# Patient Record
Sex: MALE | Race: WHITE | Employment: FULL TIME | URBAN - METROPOLITAN AREA
[De-identification: names, ages, dates, MRNs, and addresses within clinical notes are randomized per-mention and may not be internally consistent; named-entity substitution may affect disease eponyms.]

---

## 2018-05-11 ENCOUNTER — OFFICE VISIT (OUTPATIENT)
Dept: SURGERY | Facility: CLINIC | Age: 43
End: 2018-05-11

## 2018-05-11 VITALS
DIASTOLIC BLOOD PRESSURE: 83 MMHG | TEMPERATURE: 97 F | WEIGHT: 196 LBS | BODY MASS INDEX: 28.06 KG/M2 | HEART RATE: 64 BPM | SYSTOLIC BLOOD PRESSURE: 136 MMHG | HEIGHT: 70 IN

## 2018-05-11 DIAGNOSIS — K64.8 INTERNAL HEMORRHOIDS WITH COMPLICATION: Primary | ICD-10-CM

## 2018-05-11 PROCEDURE — 46600 DIAGNOSTIC ANOSCOPY SPX: CPT | Performed by: SURGERY

## 2018-05-11 PROCEDURE — 99243 OFF/OP CNSLTJ NEW/EST LOW 30: CPT | Performed by: SURGERY

## 2018-05-15 ENCOUNTER — TELEPHONE (OUTPATIENT)
Dept: SURGERY | Facility: CLINIC | Age: 43
End: 2018-05-15

## 2018-05-15 DIAGNOSIS — K64.9 HEMORRHOIDS, UNSPECIFIED HEMORRHOID TYPE: Primary | ICD-10-CM

## 2018-05-16 ENCOUNTER — TELEPHONE (OUTPATIENT)
Dept: SURGERY | Facility: CLINIC | Age: 43
End: 2018-05-16

## 2018-05-23 ENCOUNTER — TELEPHONE (OUTPATIENT)
Dept: SURGERY | Facility: CLINIC | Age: 43
End: 2018-05-23

## 2018-05-23 ENCOUNTER — ANESTHESIA (OUTPATIENT)
Dept: SURGERY | Facility: HOSPITAL | Age: 43
End: 2018-05-23
Payer: COMMERCIAL

## 2018-05-23 ENCOUNTER — ANESTHESIA EVENT (OUTPATIENT)
Dept: SURGERY | Facility: HOSPITAL | Age: 43
End: 2018-05-23
Payer: COMMERCIAL

## 2018-05-23 DIAGNOSIS — K64.5 THROMBOSED HEMORRHOIDS: Primary | ICD-10-CM

## 2018-05-23 RX ORDER — ACETAMINOPHEN 500 MG
1000 TABLET ORAL ONCE
Status: CANCELLED | OUTPATIENT
Start: 2018-05-23 | End: 2018-05-23

## 2018-05-23 RX ORDER — SODIUM CHLORIDE, SODIUM LACTATE, POTASSIUM CHLORIDE, CALCIUM CHLORIDE 600; 310; 30; 20 MG/100ML; MG/100ML; MG/100ML; MG/100ML
INJECTION, SOLUTION INTRAVENOUS CONTINUOUS
Status: CANCELLED | OUTPATIENT
Start: 2018-05-23

## 2018-05-24 ENCOUNTER — SURGERY (OUTPATIENT)
Age: 43
End: 2018-05-24

## 2018-05-24 ENCOUNTER — HOSPITAL ENCOUNTER (OUTPATIENT)
Facility: HOSPITAL | Age: 43
Setting detail: HOSPITAL OUTPATIENT SURGERY
Discharge: HOME | End: 2018-05-24
Attending: SURGERY | Admitting: SURGERY
Payer: COMMERCIAL

## 2018-05-24 VITALS
DIASTOLIC BLOOD PRESSURE: 83 MMHG | WEIGHT: 198 LBS | OXYGEN SATURATION: 97 % | HEART RATE: 67 BPM | TEMPERATURE: 98 F | SYSTOLIC BLOOD PRESSURE: 120 MMHG | RESPIRATION RATE: 20 BRPM | HEIGHT: 70 IN | BODY MASS INDEX: 28.35 KG/M2

## 2018-05-24 DIAGNOSIS — K64.9 HEMORRHOIDS, UNSPECIFIED HEMORRHOID TYPE: ICD-10-CM

## 2018-05-24 PROCEDURE — 06LY3ZC OCCLUSION OF HEMORRHOIDAL PLEXUS, PERCUTANEOUS APPROACH: ICD-10-PCS | Performed by: SURGERY

## 2018-05-24 PROCEDURE — 46947 HEMORRHOIDOPEXY BY STAPLING: CPT | Performed by: SURGERY

## 2018-05-24 RX ORDER — ACETAMINOPHEN 500 MG
1000 TABLET ORAL ONCE
COMMUNITY

## 2018-05-24 RX ORDER — MEPERIDINE HYDROCHLORIDE 25 MG/ML
12.5 INJECTION INTRAMUSCULAR; INTRAVENOUS; SUBCUTANEOUS AS NEEDED
Status: DISCONTINUED | OUTPATIENT
Start: 2018-05-24 | End: 2018-05-24

## 2018-05-24 RX ORDER — DOCUSATE SODIUM 100 MG/1
100 CAPSULE, LIQUID FILLED ORAL 3 TIMES DAILY
Qty: 90 CAPSULE | Refills: 0 | Status: SHIPPED | OUTPATIENT
Start: 2018-05-24

## 2018-05-24 RX ORDER — SODIUM CHLORIDE, SODIUM LACTATE, POTASSIUM CHLORIDE, CALCIUM CHLORIDE 600; 310; 30; 20 MG/100ML; MG/100ML; MG/100ML; MG/100ML
INJECTION, SOLUTION INTRAVENOUS CONTINUOUS
Status: DISCONTINUED | OUTPATIENT
Start: 2018-05-24 | End: 2018-05-24

## 2018-05-24 RX ORDER — HYDROMORPHONE HYDROCHLORIDE 1 MG/ML
INJECTION, SOLUTION INTRAMUSCULAR; INTRAVENOUS; SUBCUTANEOUS
Status: COMPLETED
Start: 2018-05-24 | End: 2018-05-24

## 2018-05-24 RX ORDER — HYDROMORPHONE HYDROCHLORIDE 1 MG/ML
0.4 INJECTION, SOLUTION INTRAMUSCULAR; INTRAVENOUS; SUBCUTANEOUS EVERY 5 MIN PRN
Status: DISCONTINUED | OUTPATIENT
Start: 2018-05-24 | End: 2018-05-24

## 2018-05-24 RX ORDER — METOCLOPRAMIDE HYDROCHLORIDE 5 MG/ML
10 INJECTION INTRAMUSCULAR; INTRAVENOUS AS NEEDED
Status: DISCONTINUED | OUTPATIENT
Start: 2018-05-24 | End: 2018-05-24

## 2018-05-24 RX ORDER — DEXAMETHASONE SODIUM PHOSPHATE 4 MG/ML
4 VIAL (ML) INJECTION AS NEEDED
Status: DISCONTINUED | OUTPATIENT
Start: 2018-05-24 | End: 2018-05-24

## 2018-05-24 RX ORDER — ACETAMINOPHEN 500 MG
1000 TABLET ORAL ONCE AS NEEDED
Status: DISCONTINUED | OUTPATIENT
Start: 2018-05-24 | End: 2018-05-24

## 2018-05-24 RX ORDER — HYDROCODONE BITARTRATE AND ACETAMINOPHEN 5; 325 MG/1; MG/1
TABLET ORAL
Qty: 20 TABLET | Refills: 0 | Status: SHIPPED | OUTPATIENT
Start: 2018-05-24 | End: 2018-06-08 | Stop reason: ALTCHOICE

## 2018-05-24 RX ORDER — LABETALOL HYDROCHLORIDE 5 MG/ML
5 INJECTION, SOLUTION INTRAVENOUS EVERY 5 MIN PRN
Status: DISCONTINUED | OUTPATIENT
Start: 2018-05-24 | End: 2018-05-24

## 2018-05-24 RX ORDER — MIDAZOLAM HYDROCHLORIDE 1 MG/ML
1 INJECTION INTRAMUSCULAR; INTRAVENOUS EVERY 5 MIN PRN
Status: DISCONTINUED | OUTPATIENT
Start: 2018-05-24 | End: 2018-05-24

## 2018-05-24 RX ORDER — HEPARIN SODIUM 5000 [USP'U]/ML
INJECTION, SOLUTION INTRAVENOUS; SUBCUTANEOUS
Status: COMPLETED
Start: 2018-05-24 | End: 2018-05-24

## 2018-05-24 RX ORDER — HYDROCODONE BITARTRATE AND ACETAMINOPHEN 5; 325 MG/1; MG/1
2 TABLET ORAL AS NEEDED
Status: DISCONTINUED | OUTPATIENT
Start: 2018-05-24 | End: 2018-05-24

## 2018-05-24 RX ORDER — BUPIVACAINE HYDROCHLORIDE AND EPINEPHRINE 5; 5 MG/ML; UG/ML
INJECTION, SOLUTION EPIDURAL; INTRACAUDAL; PERINEURAL AS NEEDED
Status: DISCONTINUED | OUTPATIENT
Start: 2018-05-24 | End: 2018-05-24 | Stop reason: HOSPADM

## 2018-05-24 RX ORDER — ONDANSETRON 2 MG/ML
4 INJECTION INTRAMUSCULAR; INTRAVENOUS AS NEEDED
Status: DISCONTINUED | OUTPATIENT
Start: 2018-05-24 | End: 2018-05-24

## 2018-05-24 RX ORDER — HYDROCODONE BITARTRATE AND ACETAMINOPHEN 5; 325 MG/1; MG/1
1 TABLET ORAL AS NEEDED
Status: DISCONTINUED | OUTPATIENT
Start: 2018-05-24 | End: 2018-05-24

## 2018-05-24 RX ORDER — NALOXONE HYDROCHLORIDE 0.4 MG/ML
80 INJECTION, SOLUTION INTRAMUSCULAR; INTRAVENOUS; SUBCUTANEOUS AS NEEDED
Status: DISCONTINUED | OUTPATIENT
Start: 2018-05-24 | End: 2018-05-24

## 2018-05-24 RX ORDER — HEPARIN SODIUM 5000 [USP'U]/ML
5000 INJECTION, SOLUTION INTRAVENOUS; SUBCUTANEOUS ONCE
Status: COMPLETED | OUTPATIENT
Start: 2018-05-24 | End: 2018-05-24

## 2018-05-24 NOTE — ANESTHESIA PREPROCEDURE EVALUATION
PRE-OP EVALUATION    Patient Name: Shira Zaman    Pre-op Diagnosis: Hemorrhoids, unspecified hemorrhoid type [K64.9]    Procedure(s):  STAPLED HEMORRHOIDECTOMY    Surgeon(s) and Role:     * Adore Zuniga MD - Primary    Pre-op [facility]      Rhythm: regular  Rate: normal     Dental             Pulmonary      Breath sounds clear to auscultation bilaterally. Other findings            ASA: 2   Plan: general  NPO status verified and patient meets guidelines.     Po

## 2018-05-24 NOTE — ANESTHESIA POSTPROCEDURE EVALUATION
[id number] [facility] Patient Status:  Hospital Outpatient Surgery   Age/Gender 38year old male MRN [id number]   Location [address] [place] [address] Cass Lowe MD   Hosp Day # 0 PCP Lesli Almodovar

## 2018-05-24 NOTE — BRIEF OP NOTE
Pre-Operative Diagnosis: Hemorrhoids, unspecified hemorrhoid type [K64.9]     Post-Operative Diagnosis: Hemorrhoids, unspecified hemorrhoid type [K64.9]      Procedure Performed:   Procedure(s):  PPH STAPLED HEMORRHOIDECTOMY    Surgeon(s) and Role:     * P

## 2018-05-24 NOTE — OPERATIVE REPORT
OPERATIVE REPORT   PREOPERATIVE DIAGNOSIS: Prolapsed hemorrhoids. POSTOPERATIVE DIAGNOSIS: Prolapsed hemorrhoids. PROCEDURE PERFORMED: PPH stapled hemorrhoidectomy. ASSISTANT: Staff  ANESTHESIA: General endotracheal anesthesia.    ANESTHESIOLOGIST:  with the [facility] HSPTL stapler was inserted into the anus and secured in place with 2-0 nylon suture. A circumferential suture of #1 Prolene was used to encircle the anus at a level 2-3 cm above the dentate line, incorporating all hemorrhoidal tissues.  This pursestr

## 2018-05-24 NOTE — H&P
Active Problems      1. Internal hemorrhoids with complication          Chief Complaint   Patient presents with:  Hemorrhoids: NW/EST PTA FOR HEMORRHOIDS- has been seen prior regarding. bleeding with wiping and toilets. has had previous bandings. bad pain. Years of education:                 Number of children:                Social History Main Topics    Smoking status: Never Smoker                                                                 Smokeless tobacco: Former User                       Alcoh 136/83 (BP Location: Left arm, Patient Position: Sitting, Cuff Size: large)   Pulse 64   Temp (!) 96.9 °F (36.1 °C) (Oral)   Ht 70\"   Wt 196 lb   BMI 28.12 kg/m²   Physical Exam   Constitutional: He is oriented to person, place, and time.  He appears well- in length. ·    · The risks, benefits, and alternatives to the procedure were explained to the patient.   The risks explained include, but are not limited to, bleeding, infection, pain wound complications, recurrence, incorrect diagnosis, injury to adjace

## 2018-05-30 ENCOUNTER — TELEPHONE (OUTPATIENT)
Dept: SURGERY | Facility: CLINIC | Age: 43
End: 2018-05-30

## 2018-05-30 NOTE — TELEPHONE ENCOUNTER
Pt called office for norco refill. Had hemorroidectomy 5/24 with Dr. Dominique Bell. Refill filled for norco 5/325 1-2 tab po q 4-6 hrs for pain. Disp 30. Per Dr. Danny [place]. Script ready for pt. Pickup.

## 2018-06-08 ENCOUNTER — OFFICE VISIT (OUTPATIENT)
Dept: SURGERY | Facility: CLINIC | Age: 43
End: 2018-06-08

## 2018-06-08 VITALS
WEIGHT: 188.63 LBS | DIASTOLIC BLOOD PRESSURE: 86 MMHG | BODY MASS INDEX: 26.41 KG/M2 | SYSTOLIC BLOOD PRESSURE: 134 MMHG | HEIGHT: 71 IN | HEART RATE: 52 BPM | RESPIRATION RATE: 16 BRPM | TEMPERATURE: 97 F

## 2018-06-08 DIAGNOSIS — K64.8 INTERNAL HEMORRHOIDS WITH COMPLICATION: Primary | ICD-10-CM

## 2018-06-08 PROCEDURE — 99024 POSTOP FOLLOW-UP VISIT: CPT | Performed by: SURGERY

## 2018-06-08 NOTE — PROGRESS NOTES
Post Operative Visit Note       Active Problems  1. Internal hemorrhoids with complication         Chief Complaint   Patient presents with:  Post-Op: p/o hemorrhoidectomy on 5/24. PT has rectal PN (8/10)- ran out of Wanna Matthew- states tylenol doesn't work.  PT d Alcohol use:  Yes                Comment: social    Drug use: No            Other Topics            Concern  Caffeine Concern        Yes    Comment:coffee 1 cup daily  Exercise                Yes  Seat Belt               Yes         Current Outp patient did experience postoperative pain and discomfort but this is gradually improving. His narcotic pain medication was refilled today; we discussed about pain management strategies in order to minimize narcotic requirements.   The patient voiced unders

## 2018-06-21 ENCOUNTER — OFFICE VISIT (OUTPATIENT)
Dept: SURGERY | Facility: CLINIC | Age: 43
End: 2018-06-21

## 2018-06-21 VITALS
DIASTOLIC BLOOD PRESSURE: 75 MMHG | SYSTOLIC BLOOD PRESSURE: 117 MMHG | HEART RATE: 67 BPM | TEMPERATURE: 97 F | BODY MASS INDEX: 26.32 KG/M2 | WEIGHT: 188 LBS | HEIGHT: 71 IN

## 2018-06-21 DIAGNOSIS — K64.8 INTERNAL HEMORRHOIDS WITH COMPLICATION: Primary | ICD-10-CM

## 2018-06-21 PROCEDURE — 99024 POSTOP FOLLOW-UP VISIT: CPT | Performed by: SURGERY

## 2018-06-21 NOTE — PROGRESS NOTES
Post Operative Visit Note       Active Problems  1. Internal hemorrhoids with complication         Chief Complaint   Patient presents with:  Post-Op: Post op visit-- Hemorrhoidectomy done 5/24/18. Still some bleeding and pressure pain.          History of P Smoking status: Never Smoker                                                                Smokeless tobacco: Former User                       Alcohol use:  Yes                Comment: social    Drug use: No            Other Topics            Concern  Caf appears well-developed and well-nourished. Cardiovascular: Normal rate, regular rhythm, normal heart sounds and intact distal pulses. Pulmonary/Chest: Effort normal and breath sounds normal.   Abdominal: Soft.  Bowel sounds are normal.   Genitourinary:

## 2019-08-14 ENCOUNTER — MED REC SCAN ONLY (OUTPATIENT)
Dept: FAMILY MEDICINE CLINIC | Facility: CLINIC | Age: 44
End: 2019-08-14

## 2020-08-20 ENCOUNTER — TELEPHONE (OUTPATIENT)
Dept: FAMILY MEDICINE CLINIC | Facility: CLINIC | Age: 45
End: 2020-08-20

## 2020-08-20 DIAGNOSIS — Z11.2 ENCOUNTER FOR SCREENING FOR OTHER BACTERIAL DISEASE: Primary | ICD-10-CM

## 2020-08-20 NOTE — TELEPHONE ENCOUNTER
Pt wife called, she tested positive for H-Pilori and she is wanting pt to get tested as well. She is requesting order for breath test. Please advise, thank you.

## 2020-08-20 NOTE — TELEPHONE ENCOUNTER
Left message on Sarah's  [phone] to call office back to notify her that requested orders were placed. Other # was incorrect #.

## 2020-08-21 NOTE — TELEPHONE ENCOUNTER
lmom for pt's wife that order in system and can schedule at [phone] or online [email or web address]

## 2020-09-18 ENCOUNTER — LAB ENCOUNTER (OUTPATIENT)
Dept: LAB | Age: 45
End: 2020-09-18
Attending: NURSE PRACTITIONER
Payer: COMMERCIAL

## 2020-09-18 DIAGNOSIS — Z11.2 ENCOUNTER FOR SCREENING FOR OTHER BACTERIAL DISEASE: ICD-10-CM

## 2020-09-18 PROCEDURE — 83013 H PYLORI (C-13) BREATH: CPT | Performed by: NURSE PRACTITIONER

## 2020-09-20 LAB — H. PYLORI BREATH TEST: NEGATIVE

## 2020-09-21 ENCOUNTER — TELEPHONE (OUTPATIENT)
Dept: FAMILY MEDICINE CLINIC | Facility: CLINIC | Age: 45
End: 2020-09-21

## 2022-07-06 ENCOUNTER — MED REC SCAN ONLY (OUTPATIENT)
Dept: FAMILY MEDICINE CLINIC | Facility: CLINIC | Age: 47
End: 2022-07-06

## 2022-09-29 ENCOUNTER — TELEPHONE (OUTPATIENT)
Dept: FAMILY MEDICINE CLINIC | Facility: CLINIC | Age: 47
End: 2022-09-29

## 2022-11-09 ENCOUNTER — TELEPHONE (OUTPATIENT)
Dept: FAMILY MEDICINE CLINIC | Facility: CLINIC | Age: 47
End: 2022-11-09

## 2023-01-30 ENCOUNTER — MED REC SCAN ONLY (OUTPATIENT)
Dept: FAMILY MEDICINE CLINIC | Facility: CLINIC | Age: 48
End: 2023-01-30

## 2023-08-26 ENCOUNTER — TELEPHONE (OUTPATIENT)
Dept: FAMILY MEDICINE CLINIC | Facility: CLINIC | Age: 48
End: 2023-08-26

## 2024-10-15 ENCOUNTER — TELEPHONE (OUTPATIENT)
Dept: FAMILY MEDICINE CLINIC | Facility: CLINIC | Age: 49
End: 2024-10-15

## 2024-10-15 DIAGNOSIS — Z11.2 HELICOBACTER PYLORI STOOL TEST EQUIVOCAL: Primary | ICD-10-CM

## 2024-10-15 NOTE — TELEPHONE ENCOUNTER
Patient mother in law h.pylori pos, discussed with pt's wife results. Per Dr. Waite ok to order stool testing for all family members living with mother in law that are Dr. Waite patients.     Dr. Waite- pt has seen you, but not since 2016. Would you order or need office visit?

## 2024-10-22 ENCOUNTER — LAB ENCOUNTER (OUTPATIENT)
Dept: LAB | Facility: HOSPITAL | Age: 49
End: 2024-10-22
Attending: FAMILY MEDICINE
Payer: COMMERCIAL

## 2024-10-22 DIAGNOSIS — Z11.2 HELICOBACTER PYLORI STOOL TEST EQUIVOCAL: ICD-10-CM

## 2024-10-22 PROCEDURE — 87338 HPYLORI STOOL AG IA: CPT

## 2024-10-24 LAB — H PYLORI AG STL QL IA: NEGATIVE

## 2025-02-05 NOTE — PROGRESS NOTES
Karyn Guillen Patient Age: 52 year old  MESSAGE:   Patient returning recent call for labs results completed on 01/27/2025. Transferred to triage       WEIGHT AND HEIGHT:   Wt Readings from Last 1 Encounters:   12/19/24 71.5 kg (157 lb 11.2 oz)     Ht Readings from Last 1 Encounters:   12/19/24 5' 7\" (1.702 m)     BMI Readings from Last 1 Encounters:   12/19/24 24.70 kg/m²       ALLERGIES:  Patient has no known allergies.  Current Outpatient Medications   Medication Sig Dispense Refill    metroNIDAZOLE (FLAGYL) 500 MG tablet Take 1.5 tablets by mouth in the morning and 1.5 tablets at noon and 1.5 tablets in the evening. Do all this for 10 days. 45 tablet 0    simvastatin (ZOCOR) 20 MG tablet Take 1 tablet by mouth nightly. 90 tablet 3    escitalopram (LEXAPRO) 20 MG tablet Take 1.5 tablets by mouth daily. 135 tablet 3    buPROPion XL (WELLBUTRIN XL) 300 MG 24 hr tablet Take 1 tablet by mouth daily. 90 tablet 3    estradiol-norethindrone (CombiPatch) 0.05-0.14 MG/DAY Place 1 patch onto the skin 2 days a week. 24 patch 4    meloxicam (MOBIC) 15 MG tablet As needed      Multiple Vitamin (MULTIVITAMIN ADULT PO)       Probiotic Product (PROBIOTIC DAILY PO)       TURMERIC PO        No current facility-administered medications for this visit.     PHARMACY to use: see below          Pharmacy preference(s) on file:   [id number] - [place], [place] - [address] [place] [address] & [address] [address]  Phone: [phone] Fax: [phone]      CALL BACK INFO: Ok to leave response (including medical information) with family member or on answering machine  ROUTING: Patient's physician/staff        PCP: Shital Crespo MD         INS: Payor: LD/OTIS / Plan: [id number] / Product Type: PPO MISC   [address]  [address]   [facility] [place] [address]   Follow Up Visit Note       Active Problems      1. Internal hemorrhoids with complication          Chief Complaint   Patient presents with:  Hemorrhoids: NW/EST PTA FOR HEMORRHOIDS- has been seen prior regarding. bleeding with wiping and toilets.  has had p Years of education:                 Number of children:               Social History Main Topics    Smoking status: Never Smoker                                                                Smokeless tobacco: Former User 136/83 (BP Location: Left arm, Patient Position: Sitting, Cuff Size: large)   Pulse 64   Temp (!) 96.9 °F (36.1 °C) (Oral)   Ht 70\"   Wt 196 lb   BMI 28.12 kg/m²   Physical Exam   Constitutional: He is oriented to person, place, and time.  He appears well- length. ·   · The risks, benefits, and alternatives to the procedure were explained to the patient.   The risks explained include, but are not limited to, bleeding, infection, pain wound complications, recurrence, incorrect diagnosis, injury to adjacent o

## 2025-03-18 ENCOUNTER — TELEPHONE (OUTPATIENT)
Dept: FAMILY MEDICINE CLINIC | Facility: CLINIC | Age: 50
End: 2025-03-18

## 2025-04-01 ENCOUNTER — OFFICE VISIT (OUTPATIENT)
Dept: FAMILY MEDICINE CLINIC | Facility: CLINIC | Age: 50
End: 2025-04-01
Payer: COMMERCIAL

## 2025-04-01 VITALS
BODY MASS INDEX: 29.92 KG/M2 | SYSTOLIC BLOOD PRESSURE: 120 MMHG | WEIGHT: 209 LBS | DIASTOLIC BLOOD PRESSURE: 78 MMHG | HEIGHT: 70 IN | OXYGEN SATURATION: 98 % | HEART RATE: 64 BPM

## 2025-04-01 DIAGNOSIS — R07.2 PRECORDIAL PAIN: ICD-10-CM

## 2025-04-01 DIAGNOSIS — Z00.00 LABORATORY EXAMINATION ORDERED AS PART OF A ROUTINE GENERAL MEDICAL EXAMINATION: ICD-10-CM

## 2025-04-01 DIAGNOSIS — Z00.00 ROUTINE GENERAL MEDICAL EXAMINATION AT A HEALTH CARE FACILITY: Primary | ICD-10-CM

## 2025-04-01 DIAGNOSIS — Z12.11 SCREENING FOR COLON CANCER: ICD-10-CM

## 2025-04-01 DIAGNOSIS — Z12.5 SCREENING FOR PROSTATE CANCER: ICD-10-CM

## 2025-04-01 PROCEDURE — 99202 OFFICE O/P NEW SF 15 MIN: CPT | Performed by: FAMILY MEDICINE

## 2025-04-01 PROCEDURE — 3008F BODY MASS INDEX DOCD: CPT | Performed by: FAMILY MEDICINE

## 2025-04-01 PROCEDURE — 99386 PREV VISIT NEW AGE 40-64: CPT | Performed by: FAMILY MEDICINE

## 2025-04-01 PROCEDURE — 3078F DIAST BP <80 MM HG: CPT | Performed by: FAMILY MEDICINE

## 2025-04-01 PROCEDURE — 3074F SYST BP LT 130 MM HG: CPT | Performed by: FAMILY MEDICINE

## 2025-04-01 NOTE — PROGRESS NOTES
Paresh Robert is a 49 year old male who presents for a complete physical exam.   HPI:        Chief Complaint   Patient presents with    Well Adult       Patient is present for complete physical. Feels very well. Up to date with dental visits.No hearing problems. Vaccinations: up to date.  Precordial chest pain, pressure, only at work when pt walks a lot. Got prior cardiac work up with cardiologist, all normal.  No palpitations, no orthopnea, no edema of the legs, no dizziness, no syncope, no SOB.      Wt Readings from Last 3 Encounters:   04/01/25 209 lb (94.8 kg)   06/21/18 188 lb (85.3 kg)   06/08/18 188 lb 9.6 oz (85.5 kg)      BP Readings from Last 3 Encounters:   04/01/25 120/78   06/21/18 117/75   06/08/18 134/86       AST (U/L)   Date Value   11/09/2016 23     ALT (U/L)   Date Value   11/09/2016 49      Current Outpatient Medications   Medication Sig Dispense Refill    acetaminophen 500 MG Oral Tab Take 1,000 mg by mouth once.      docusate sodium (COLACE) 100 MG Oral Cap Take 1 capsule (100 mg total) by mouth 3 (three) times daily. 90 capsule 0      Past Medical History:    Esophageal reflux      Past Surgical History:   Procedure Laterality Date    Colonoscopy  3/24/15= Hemorrhoids    Repeat 2025    Colonoscopy N/A 3/24/2015    Procedure: COLONOSCOPY;  Surgeon: Joe Bloom MD;  Location:  ENDOSCOPY    Hemorrhoidectomy  05/24/2018    Other surgical history  03/25/16    rectal exam under anesthesia  band ligation of internal hemorrhoids      History reviewed. No pertinent family history.   Social History     Socioeconomic History    Marital status:    Tobacco Use    Smoking status: Never    Smokeless tobacco: Former   Substance and Sexual Activity    Alcohol use: Yes     Comment: social    Drug use: No   Other Topics Concern    Caffeine Concern Yes     Comment: coffee 1 cup daily    Exercise Yes    Seat Belt Yes     Social Drivers of Health      Received from [employer]    [facility]  Housing     Exercise: walking.  Diet: watches somewhat     REVIEW OF SYSTEMS:   GENERAL HEALTH: feels well, no fatigue.  SKIN: denies any unusual skin lesions or rashes  EYES: no visual complaints or deficits  HEENT: denies nasal congestion, sinus pain or sore throat; hearing loss negative.  RESPIRATORY: denies shortness of breath, wheezing or cough   CARDIOVASCULAR: denies SOB on exertion,no orthopnea, no edema, no palpitations   GI: denies nausea, vomiting, constipation, diarrhea; no rectal bleeding; no heartburn  GENITAL/: no dysuria, urgency or frequency; no epididymal or testicular pain; no penile discharge; no hernias; no erectile dysfunction; no nocturia  MUSCULOSKELETAL: no joint complaints upper or lower extremities  NEURO: no sensory or motor complaint  PSYCH: no symptoms of depression or anxiety, no insomnia.  HEMATOLOGY: denies hx anemia; denies bruising or excessive bleeding  ENDOCRINE: denies excessive thirst or urination; denies unexpected wt gain or wt loss  ALLERGY/IMM.: denies food or seasonal allergies  PSYCH: no anxiety, depression, mood issues.    EXAM:   /78   Pulse 64   Ht 5' 10\" (1.778 m)   Wt 209 lb (94.8 kg)   SpO2 98%   BMI 29.99 kg/m²     General: WD/WN in no acute distress.   HEENT: PERRLA and EOMI.  OP moist no lesions. TM normal, canals normal.  NECK: is supple,thyroid- normal. No carotid bruits.    Heart: is RRR.  S1, S2, with no murmurs.    Lungs: are clear to auscultation bilaterally, with no wheeze, rhonchi, or rales.  Heart: is RRR.  S1, S2, with no murmurs.    Abdomen: is soft, NT/ND with no HSM.  No rebound or guarding, NABS.     exam: testes bilaterally descended without masses.  No urethral D/C, or skin lesions.  No inguinal hernias.   Rectal exam: has normal tone, no masses.  Prostate is normal, non-tender without nodules.  Extremities: are symmetric with no cyanosis, clubbing, or edema.    Skin: is unremarkable without rashes.    Neuro: no focal abnormalities,  and reflexes coordination and gait normal and symmetric.   MUSK/SKEL: Normal muscles tones, no joint abnormalities, full ROM of all extremities.    back- normal curves, no scoliosis, normal gait,  No joint or mulses abnormalities.  Psych: pt is alert, oriented x 3, normal affect.      ASSESSMENT AND PLAN:     Paresh Robert is a 49 year old male who presents for a complete physical exam.   Pt's weight is Body mass index is 29.99 kg/m²., recommended low fat diet and aerobic exercise 30 minutes three times weekly.       Precordial pain  -     CT CALCIUM SCORING; Future  -     CT CALCIUM SCORING    Screening for colon cancer  -     COLOGUARD COLON CANCER SCREENING (EXTERNAL)    Laboratory examination ordered as part of a routine general medical examination  -     CBC With Differential With Platelet; Future  -     Comp Metabolic Panel (14); Future  -     Lipid Panel; Future  -     Assay, Thyroid Stim Hormone; Future    Screening for prostate cancer  -     PSA Total, Screen; Future       The patient indicates understanding of these issues and agrees to the plan.  The patient is asked to return for CPX in 1 year.

## 2025-04-01 NOTE — PATIENT INSTRUCTIONS
[facility]     [address] [place] [address]   Ph: [phone]   Fax: [phone]

## 2025-04-03 ENCOUNTER — PATIENT MESSAGE (OUTPATIENT)
Dept: FAMILY MEDICINE CLINIC | Facility: CLINIC | Age: 50
End: 2025-04-03

## 2025-04-07 NOTE — TELEPHONE ENCOUNTER
Obdulia Selene chce sie upewnic, ze Obdulia Domichelleor otrblas ramachandran na daty patitoa Ish Denis od 04/11 do 04/21.

## 2025-04-08 LAB — AMB EXT COLOGUARD RESULT: NEGATIVE

## 2025-04-09 ENCOUNTER — TELEMEDICINE (OUTPATIENT)
Dept: FAMILY MEDICINE CLINIC | Facility: CLINIC | Age: 50
End: 2025-04-09
Payer: COMMERCIAL

## 2025-04-09 DIAGNOSIS — R06.02 SOB (SHORTNESS OF BREATH): Primary | ICD-10-CM

## 2025-04-09 DIAGNOSIS — F43.9 STRESS: ICD-10-CM

## 2025-04-09 PROCEDURE — 98006 SYNCH AUDIO-VIDEO EST MOD 30: CPT | Performed by: FAMILY MEDICINE

## 2025-04-09 NOTE — PROGRESS NOTES
Paresh Robert verbally consents to a Virtual/VideoCheck-In service on 04/09/25.    Time spent: 30 min      Paresh Robert is a 49 year old male presents with SOB.      HPI:  The patient complaints of SOB, moderate, started:few weeks ago , worse with physical activities and better with rest.  New symptoms for the pt.  No cold, no cough, no wheezing.Abnormal heart scan.        Current Medications[1]   Past Medical History[2]   Past Surgical History[3]   Social History:    Short Social Hx on File[4]      REVIEW OF SYSTEMS:   GENERAL HEALTH: feels well otherwise, no weight change, no fatigue, no fever or chills.  EYE: no abnormal vision,no blurry vision  RESPIRATORY: shortness of breath  CARDIOVASCULAR: denies palpitations or orthopnea, no edema.  GI: denies abdominal pain and denies heartburn  NEURO: denies headaches, abnormal sensation.  PSYCH: no panic attacks    EXAM:   There were no vitals taken for this visit.  GENERAL: in no apparent distress  HEENT: normal voice  LUNGS: no SOB  PSYCH: normal mood.     A/P:   SOB, stress, pt would like to take time from work, note given.  F/u with cardiology.       [1]   Current Outpatient Medications   Medication Sig Dispense Refill    acetaminophen 500 MG Oral Tab Take 1,000 mg by mouth once.      docusate sodium (COLACE) 100 MG Oral Cap Take 1 capsule (100 mg total) by mouth 3 (three) times daily. 90 capsule 0   [2]   Past Medical History:   Esophageal reflux   [3]   Past Surgical History:  Procedure Laterality Date    Colonoscopy  3/24/15= Hemorrhoids    Repeat 2025    Colonoscopy N/A 3/24/2015    Procedure: COLONOSCOPY;  Surgeon: Joe Bloom MD;  Location:  ENDOSCOPY    Hemorrhoidectomy  05/24/2018    Other surgical history  03/25/16    rectal exam under anesthesia  band ligation of internal hemorrhoids   [4]   Social History  Socioeconomic History    Marital status:    Tobacco Use    Smoking status: Never    Smokeless tobacco: Former    Substance and Sexual Activity    Alcohol use: Yes     Comment: social    Drug use: No   Other Topics Concern    Caffeine Concern Yes     Comment: coffee 1 cup daily    Exercise Yes    Seat Belt Yes     Social Drivers of Health      Received from [employer]    [facility] Housing

## 2025-04-16 ENCOUNTER — TELEPHONE (OUTPATIENT)
Dept: FAMILY MEDICINE CLINIC | Facility: CLINIC | Age: 50
End: 2025-04-16

## 2025-04-17 ENCOUNTER — MED REC SCAN ONLY (OUTPATIENT)
Dept: FAMILY MEDICINE CLINIC | Facility: CLINIC | Age: 50
End: 2025-04-17

## (undated) DEVICE — 33MM PROXIMATE PPH, PROCEDURE FOR PROLAPSE AND HEMORRHOIDS SET: Brand: PROXIMATE

## (undated) DEVICE — SUTURE PROLENE 1 CT-1

## (undated) DEVICE — VIOLET BRAIDED (POLYGLACTIN 910), SYNTHETIC ABSORBABLE SUTURE: Brand: COATED VICRYL

## (undated) DEVICE — CAUTERY PENCIL

## (undated) DEVICE — SPONGE STICK WITH PVP-I: Brand: KENDALL

## (undated) DEVICE — #15 STERILE STAINLESS BLADE: Brand: STERILE STAINLESS BLADES

## (undated) DEVICE — GYN CDS: Brand: MEDLINE INDUSTRIES, INC.

## (undated) DEVICE — SOL  .9 1000ML BTL

## (undated) DEVICE — STANDARD HYPODERMIC NEEDLE,POLYPROPYLENE HUB: Brand: MONOJECT

## (undated) DEVICE — SUTURE ETHILON 2-0 FS

## (undated) DEVICE — STERILE POLYISOPRENE POWDER-FREE SURGICAL GLOVES: Brand: PROTEXIS

## (undated) DEVICE — SUTURE SILK 2-0 FSL

## (undated) NOTE — LETTER
2018    Return to School / Work    Name: Ashley Barth        : 1975    To Whom It May Concern,    Ashley Barth had surgery on 18 and is:    Not able to return to school / work.     Comments:Paresh will be re evaluate

## (undated) NOTE — LETTER
[facility]  Grisel Alonzo 59 [address] [facility], [address] [place] [address]    Consent for Operation    Date: __________________    Time: _______________    1.  I authorize the performance upon Lawrance Crow the following operation:    Procedure(s):  STAPLED HEM procedure has been videotaped, the surgeon will obtain the original videotape. The hospital will not be responsible for storage or maintenance of this tape.     6. For the purpose of advancing medical education, I consent to the admittance of observers to t STATEMENTS REQUIRING INSERTION OR COMPLETION WERE FILLED IN.     Signature of Patient:   ___________________________    When the patient is a minor or mentally incompetent to give consent:  Signature of person authorized to consent for patient: ____________ supplements, and pills I can buy without a prescription (including street drugs/illegal medications). Failure to inform my anesthesiologist about these medicines may increase my risk of anesthetic complications.   · If I am allergic to anything or have had Anesthesiologist Signature     Date   Time  I have discussed the procedure and information above with the patient (or patient’s representative) and answered their questions. The patient or their representative has agreed to have anesthesia services.     ___

## (undated) NOTE — LETTER
18    Patient: Cheryl Sandhu  : 1975 Visit date: 2018    Dear  Dr. Carlin Powell MD,    Thank you for referring Cheryl Sandhu to my practice. Please find my assessment and plan below.              Assessment   Internal

## (undated) NOTE — LETTER
18    Patient: Gayathri Portland  : 1975 Visit date: 2018    Dear  Dr. Bertha Her MD,    Thank you for referring [facility] to my practice. Please find my assessment and plan below.                Assessment   Intern

## (undated) NOTE — LETTER
18    Patient: Cheryl Sanduh  : 1975 Visit date: 2018    Dear  Dr. Carlin Powell MD,    Thank you for referring Cheryl Sandhu to my practice. Please find my assessment and plan below.              Assessment   Internal